# Patient Record
(demographics unavailable — no encounter records)

---

## 2025-01-16 NOTE — PLAN
[FreeTextEntry1] : #Viral URI Rapid flu, strep, covid-19 testing negative. Did discuss false negative possibilities so will also send out a PCR panel. Patient electing for supportive care. Will prescribe benzonatate for cough relief. Discussed worsening symptoms that may be indicative of PNA.

## 2025-01-16 NOTE — HISTORY OF PRESENT ILLNESS
[FreeTextEntry8] : 63-year-old female presenting as a new patient for an acute care visit.   She presents with acute upper respiratory symptoms in the setting of recent exposure to strep throat through her 6-year-old great niece. Her symptoms began two days ago with sore throat and have progressed to include cough productive of clear sputum, headache, rhinorrhea, and generalized achiness. She experienced a low-grade fever of 99F last night. Sleep has been disturbed by her symptoms. She has attempted self-treatment with Tylenol and NyQuil without significant relief. Her medical history is notable for being up to date with 3 COVID vaccines, and she denies recent COVID or influenza infection. She has an upcoming physical scheduled in February with her current PCP but she may opt to switch.

## 2025-01-16 NOTE — PHYSICAL EXAM
[Ill-Appearing] : ill-appearing [Normal Sclera/Conjunctiva] : normal sclera/conjunctiva [Normal Oropharynx] : the oropharynx was normal [No Lymphadenopathy] : no lymphadenopathy [Normal Gait] : normal gait [Alert and Oriented x3] : oriented to person, place, and time [Normal] : affect was normal and insight and judgment were intact